# Patient Record
Sex: FEMALE | Race: WHITE | NOT HISPANIC OR LATINO | URBAN - METROPOLITAN AREA
[De-identification: names, ages, dates, MRNs, and addresses within clinical notes are randomized per-mention and may not be internally consistent; named-entity substitution may affect disease eponyms.]

---

## 2019-05-09 ENCOUNTER — OFFICE (OUTPATIENT)
Dept: URBAN - METROPOLITAN AREA CLINIC 33 | Facility: CLINIC | Age: 44
End: 2019-05-09

## 2019-05-09 VITALS
TEMPERATURE: 98.1 F | WEIGHT: 130 LBS | DIASTOLIC BLOOD PRESSURE: 76 MMHG | HEIGHT: 64 IN | HEART RATE: 66 BPM | SYSTOLIC BLOOD PRESSURE: 109 MMHG

## 2019-05-09 DIAGNOSIS — K51.90 ULCERATIVE COLITIS, UNSPECIFIED, WITHOUT COMPLICATIONS: ICD-10-CM

## 2019-05-09 DIAGNOSIS — K62.5 HEMORRHAGE OF ANUS AND RECTUM: ICD-10-CM

## 2019-05-09 PROCEDURE — 99204 OFFICE O/P NEW MOD 45 MIN: CPT

## 2019-05-09 NOTE — SERVICEHPINOTES
SINCERE MENDEZ   is a   43  female who presents with blood in stool over 6 moths. Reports a hx of hemorrhoids. Recently delivered a baby, daughter 6 months ago. Reports a hx of ulcerative colitis diagnosed in 1999. Had another colonoscopy in 2001 in [place], told to be unremarkable, per patient. No flare up symptoms since then.  BRRecently, noted blood in stool especially after running.  Abdominal discomfort especially after eating fried foods. Eating cheeses can alleviate the pain. No correlating symptoms as UC. BRMoves bowel daily on BSS type 4-3. The blood is only with bowel movement, seen on the toilet paper and inside the toilet. Denies diarrhea, lower abdominal pain or constipation. Losing weight since delivery. + external hemorrhoids. Denies rectal pain, itchiness or discharge. BRDenies nausea, vomiting, dysphagia, acid reflux, dyspepsia or early satiety.BRShe was taking ibuprofen twice a day up to three times a week over 4 weeks for body aches after running. Stopped it last week. Denies chest pain, palpitations or sob with exertion. Denies colon cancer or IBD.   BR

## 2019-05-10 ENCOUNTER — OFFICE (OUTPATIENT)
Dept: URBAN - METROPOLITAN AREA CLINIC 33 | Facility: CLINIC | Age: 44
End: 2019-05-10

## 2019-05-10 LAB
AMBIG ABBREV CMP14 DEFAULT: (no result)
CBC WITH DIFFERENTIAL/PLATELET: BASO (ABSOLUTE): 0 X10E3/UL (ref 0–0.2)
CBC WITH DIFFERENTIAL/PLATELET: BASOS: 0 %
CBC WITH DIFFERENTIAL/PLATELET: EOS (ABSOLUTE): 0.1 X10E3/UL (ref 0–0.4)
CBC WITH DIFFERENTIAL/PLATELET: EOS: 2 %
CBC WITH DIFFERENTIAL/PLATELET: HEMATOCRIT: 43.1 % (ref 34–46.6)
CBC WITH DIFFERENTIAL/PLATELET: HEMATOLOGY COMMENTS: (no result)
CBC WITH DIFFERENTIAL/PLATELET: HEMOGLOBIN: 13.9 G/DL (ref 11.1–15.9)
CBC WITH DIFFERENTIAL/PLATELET: IMMATURE CELLS: (no result)
CBC WITH DIFFERENTIAL/PLATELET: IMMATURE GRANS (ABS): 0 X10E3/UL (ref 0–0.1)
CBC WITH DIFFERENTIAL/PLATELET: IMMATURE GRANULOCYTES: 0 %
CBC WITH DIFFERENTIAL/PLATELET: LYMPHS (ABSOLUTE): 1.9 X10E3/UL (ref 0.7–3.1)
CBC WITH DIFFERENTIAL/PLATELET: LYMPHS: 23 %
CBC WITH DIFFERENTIAL/PLATELET: MCH: 29.1 PG (ref 26.6–33)
CBC WITH DIFFERENTIAL/PLATELET: MCHC: 32.3 G/DL (ref 31.5–35.7)
CBC WITH DIFFERENTIAL/PLATELET: MCV: 90 FL (ref 79–97)
CBC WITH DIFFERENTIAL/PLATELET: MONOCYTES(ABSOLUTE): 0.6 X10E3/UL (ref 0.1–0.9)
CBC WITH DIFFERENTIAL/PLATELET: MONOCYTES: 8 %
CBC WITH DIFFERENTIAL/PLATELET: NEUTROPHILS (ABSOLUTE): 5.5 X10E3/UL (ref 1.4–7)
CBC WITH DIFFERENTIAL/PLATELET: NEUTROPHILS: 67 %
CBC WITH DIFFERENTIAL/PLATELET: NRBC: (no result)
CBC WITH DIFFERENTIAL/PLATELET: PLATELETS: 277 X10E3/UL (ref 150–379)
CBC WITH DIFFERENTIAL/PLATELET: RBC: 4.77 X10E6/UL (ref 3.77–5.28)
CBC WITH DIFFERENTIAL/PLATELET: RDW: 13.7 % (ref 12.3–15.4)
CBC WITH DIFFERENTIAL/PLATELET: WBC: 8.1 X10E3/UL (ref 3.4–10.8)
COMP. METABOLIC PANEL (14): A/G RATIO: 2 (ref 1.2–2.2)
COMP. METABOLIC PANEL (14): ALBUMIN: 4.6 G/DL (ref 3.5–5.5)
COMP. METABOLIC PANEL (14): ALKALINE PHOSPHATASE: 78 IU/L (ref 39–117)
COMP. METABOLIC PANEL (14): ALT (SGPT): 16 IU/L (ref 0–32)
COMP. METABOLIC PANEL (14): AST (SGOT): 21 IU/L (ref 0–40)
COMP. METABOLIC PANEL (14): BILIRUBIN, TOTAL: 0.4 MG/DL (ref 0–1.2)
COMP. METABOLIC PANEL (14): BUN/CREATININE RATIO: 27 — HIGH (ref 9–23)
COMP. METABOLIC PANEL (14): BUN: 22 MG/DL (ref 6–24)
COMP. METABOLIC PANEL (14): CALCIUM: 10 MG/DL (ref 8.7–10.2)
COMP. METABOLIC PANEL (14): CARBON DIOXIDE, TOTAL: 20 MMOL/L (ref 20–29)
COMP. METABOLIC PANEL (14): CHLORIDE: 103 MMOL/L (ref 96–106)
COMP. METABOLIC PANEL (14): CREATININE: 0.83 MG/DL (ref 0.57–1)
COMP. METABOLIC PANEL (14): EGFR IF AFRICN AM: 101 ML/MIN/1.73 (ref 59–?)
COMP. METABOLIC PANEL (14): EGFR IF NONAFRICN AM: 88 ML/MIN/1.73 (ref 59–?)
COMP. METABOLIC PANEL (14): GLOBULIN, TOTAL: 2.3 G/DL (ref 1.5–4.5)
COMP. METABOLIC PANEL (14): GLUCOSE: 84 MG/DL (ref 65–99)
COMP. METABOLIC PANEL (14): POTASSIUM: 3.9 MMOL/L (ref 3.5–5.2)
COMP. METABOLIC PANEL (14): PROTEIN, TOTAL: 6.9 G/DL (ref 6–8.5)
COMP. METABOLIC PANEL (14): SODIUM: 142 MMOL/L (ref 134–144)

## 2019-05-10 PROCEDURE — 00031: CPT

## 2019-05-21 ENCOUNTER — OFFICE (OUTPATIENT)
Dept: URBAN - METROPOLITAN AREA CLINIC 32 | Facility: CLINIC | Age: 44
End: 2019-05-21
Payer: COMMERCIAL

## 2019-05-21 ENCOUNTER — OFFICE (OUTPATIENT)
Dept: URBAN - METROPOLITAN AREA PATHOLOGY 17 | Facility: PATHOLOGY | Age: 44
End: 2019-05-21

## 2019-05-21 VITALS
OXYGEN SATURATION: 99 % | DIASTOLIC BLOOD PRESSURE: 82 MMHG | RESPIRATION RATE: 16 BRPM | OXYGEN SATURATION: 100 % | TEMPERATURE: 97.9 F | TEMPERATURE: 97.7 F | RESPIRATION RATE: 29 BRPM | SYSTOLIC BLOOD PRESSURE: 111 MMHG | SYSTOLIC BLOOD PRESSURE: 103 MMHG | DIASTOLIC BLOOD PRESSURE: 78 MMHG | HEIGHT: 64 IN | DIASTOLIC BLOOD PRESSURE: 64 MMHG | SYSTOLIC BLOOD PRESSURE: 115 MMHG | OXYGEN SATURATION: 98 % | RESPIRATION RATE: 31 BRPM | WEIGHT: 130 LBS | DIASTOLIC BLOOD PRESSURE: 76 MMHG | OXYGEN SATURATION: 97 % | RESPIRATION RATE: 25 BRPM | RESPIRATION RATE: 22 BRPM | SYSTOLIC BLOOD PRESSURE: 124 MMHG | DIASTOLIC BLOOD PRESSURE: 71 MMHG | SYSTOLIC BLOOD PRESSURE: 98 MMHG | SYSTOLIC BLOOD PRESSURE: 108 MMHG | DIASTOLIC BLOOD PRESSURE: 69 MMHG | DIASTOLIC BLOOD PRESSURE: 63 MMHG | HEART RATE: 63 BPM | HEART RATE: 65 BPM | HEART RATE: 64 BPM

## 2019-05-21 DIAGNOSIS — K51.80 OTHER ULCERATIVE COLITIS WITHOUT COMPLICATIONS: ICD-10-CM

## 2019-05-21 DIAGNOSIS — K62.5 HEMORRHAGE OF ANUS AND RECTUM: ICD-10-CM

## 2019-05-21 DIAGNOSIS — K63.5 POLYP OF COLON: ICD-10-CM

## 2019-05-21 DIAGNOSIS — K50.018 CROHN'S DISEASE OF SMALL INTESTINE WITH OTHER COMPLICATION: ICD-10-CM

## 2019-05-21 PROBLEM — D12.0 BENIGN NEOPLASM OF CECUM: Status: ACTIVE | Noted: 2019-05-21

## 2019-05-21 PROCEDURE — 45380 COLONOSCOPY AND BIOPSY: CPT

## 2019-05-21 PROCEDURE — 88305 TISSUE EXAM BY PATHOLOGIST: CPT

## 2019-05-21 RX ADMIN — Medication: at 09:48

## 2019-06-18 ENCOUNTER — OFFICE (OUTPATIENT)
Dept: URBAN - METROPOLITAN AREA CLINIC 33 | Facility: CLINIC | Age: 44
End: 2019-06-18

## 2019-06-18 VITALS — HEIGHT: 64 IN

## 2019-06-18 DIAGNOSIS — K64.8 OTHER HEMORRHOIDS: ICD-10-CM

## 2019-06-18 DIAGNOSIS — K62.5 HEMORRHAGE OF ANUS AND RECTUM: ICD-10-CM

## 2019-06-18 DIAGNOSIS — K51.80 OTHER ULCERATIVE COLITIS WITHOUT COMPLICATIONS: ICD-10-CM

## 2019-06-18 PROCEDURE — 99214 OFFICE O/P EST MOD 30 MIN: CPT

## 2022-07-19 ENCOUNTER — TELEHEALTH PROVIDED IN PATIENT'S HOME (OUTPATIENT)
Dept: URBAN - METROPOLITAN AREA TELEHEALTH 3 | Facility: TELEHEALTH | Age: 47
End: 2022-07-19

## 2022-07-19 VITALS — HEIGHT: 64 IN | WEIGHT: 133 LBS

## 2022-07-19 DIAGNOSIS — R19.7 DIARRHEA, UNSPECIFIED: ICD-10-CM

## 2022-07-19 DIAGNOSIS — K51.90 ULCERATIVE COLITIS, UNSPECIFIED, WITHOUT COMPLICATIONS: ICD-10-CM

## 2022-07-19 PROCEDURE — 99204 OFFICE O/P NEW MOD 45 MIN: CPT | Mod: 95 | Performed by: PHYSICIAN ASSISTANT

## 2022-07-19 RX ORDER — MESALAMINE 1.2 G/1
TABLET, DELAYED RELEASE ORAL
Qty: 120 | Refills: 5 | Status: ACTIVE
Start: 2022-07-19

## 2022-07-19 NOTE — SERVICEHPINOTES
PATIENT VERIFIED BY DATE OF BIRTH AND NAME. Patient has been consented for this telecommunication visit.
yasmin hoffman  SINCERE MENDEZ   is a   46   year old    female who is being seen in consultation at the request of   ANTOINETTE ORTEGA   for concerns about colitis. She was originally diagnosed in 1998 and was on Sulfasalazine and Asacol -- stopped this after a few years - sx had resolved. Did pretty well over the years. She had some symptoms, including bleeding, and had a colonoscopy with us in 2019 - had been using NSAIDs at that time. Colonoscopy showed ileitis, benign polyps, normal-appearing tissue but chronic active colitis in a random biopsy. Tried Lialda and felt worse, so stopped. yasmin hoffman Last year she developed some redness in one eye and her eye doctor thought it might be related to her colitis. This has started happening again recently. Also notes recent hip pain as well as some abdominal cramping and diarrhea. She notes that exercise can seem to worsen GI symptoms (but no bleeding). Normally has a daily BM. 
yasmin hoffman With recent symptoms, she started taking her old mesalamine 1.2 g, 4+ pills/day. Feels they have been helpful and she would like a script.

## 2022-08-30 ENCOUNTER — OFFICE (OUTPATIENT)
Dept: URBAN - METROPOLITAN AREA PATHOLOGY 18 | Facility: PATHOLOGY | Age: 47
End: 2022-08-30

## 2022-08-30 ENCOUNTER — OFFICE (OUTPATIENT)
Dept: URBAN - METROPOLITAN AREA CLINIC 30 | Facility: CLINIC | Age: 47
End: 2022-08-30
Payer: COMMERCIAL

## 2022-08-30 VITALS
OXYGEN SATURATION: 99 % | HEART RATE: 67 BPM | OXYGEN SATURATION: 96 % | TEMPERATURE: 97.7 F | SYSTOLIC BLOOD PRESSURE: 112 MMHG | RESPIRATION RATE: 16 BRPM | SYSTOLIC BLOOD PRESSURE: 110 MMHG | SYSTOLIC BLOOD PRESSURE: 113 MMHG | HEART RATE: 63 BPM | DIASTOLIC BLOOD PRESSURE: 60 MMHG | HEIGHT: 64 IN | DIASTOLIC BLOOD PRESSURE: 63 MMHG | SYSTOLIC BLOOD PRESSURE: 118 MMHG | RESPIRATION RATE: 15 BRPM | DIASTOLIC BLOOD PRESSURE: 71 MMHG | DIASTOLIC BLOOD PRESSURE: 73 MMHG | HEART RATE: 61 BPM | DIASTOLIC BLOOD PRESSURE: 74 MMHG | HEART RATE: 62 BPM | TEMPERATURE: 97.8 F | WEIGHT: 133 LBS | SYSTOLIC BLOOD PRESSURE: 107 MMHG | SYSTOLIC BLOOD PRESSURE: 108 MMHG | RESPIRATION RATE: 26 BRPM | OXYGEN SATURATION: 98 % | DIASTOLIC BLOOD PRESSURE: 81 MMHG | RESPIRATION RATE: 20 BRPM | HEART RATE: 65 BPM | OXYGEN SATURATION: 100 %

## 2022-08-30 DIAGNOSIS — R19.7 DIARRHEA, UNSPECIFIED: ICD-10-CM

## 2022-08-30 DIAGNOSIS — K63.3 ULCER OF INTESTINE: ICD-10-CM

## 2022-08-30 DIAGNOSIS — K62.5 HEMORRHAGE OF ANUS AND RECTUM: ICD-10-CM

## 2022-08-30 DIAGNOSIS — K63.89 OTHER SPECIFIED DISEASES OF INTESTINE: ICD-10-CM

## 2022-08-30 PROCEDURE — 88305 TISSUE EXAM BY PATHOLOGIST: CPT | Performed by: PATHOLOGY

## 2022-11-10 ENCOUNTER — TELEHEALTH PROVIDED IN PATIENT'S HOME (OUTPATIENT)
Dept: URBAN - METROPOLITAN AREA TELEHEALTH 3 | Facility: TELEHEALTH | Age: 47
End: 2022-11-10

## 2022-11-10 VITALS — HEIGHT: 64 IN | WEIGHT: 134 LBS

## 2022-11-10 DIAGNOSIS — K52.9 NONINFECTIVE GASTROENTERITIS AND COLITIS, UNSPECIFIED: ICD-10-CM

## 2022-11-10 DIAGNOSIS — R19.7 DIARRHEA, UNSPECIFIED: ICD-10-CM

## 2022-11-10 PROCEDURE — 99214 OFFICE O/P EST MOD 30 MIN: CPT | Mod: 95 | Performed by: PHYSICIAN ASSISTANT

## 2022-11-10 NOTE — SERVICEHPINOTES
PATIENT VERIFIED BY DATE OF BIRTH AND NAME. Patient has been consented for this telecommunication visit.
yasmin hoffman 
45 yo female presents for for f/u IBD. She had an updated colonoscopy on August 30th showing ulceration and erythema in the terminal ileum and ileal biopsy showed granulomata. No colitis noted this time. 
yasmin Coe was originally diagnosed with UC in 1998 and was on Sulfasalazine and Asacol -- stopped this after a few years - sx had resolved. Did pretty well over the years. She had some symptoms, including bleeding, and had a colonoscopy with us in 2019 - had been using NSAIDs at that time. Colonoscopy showed ileitis, benign polyps, normal-appearing tissue but chronic active colitis in a random biopsy. Tried Lialda and felt worse, so stopped. Last year she developed some redness in one eye and her eye doctor thought it might be related to her colitis. This has started happening again this past summer, and she was also having hip pain as well as some abdominal cramping and diarrhea, which prompted her to see us. She notes that exercise can seem to worsen GI symptoms (but no bleeding). Normally has a daily BM. With the symptoms this past summer, she started taking her old mesalamine 1.2 g, 4+ pills/day and felt some improvement. Has not been taking this consistently. 
yasmin hoffman Did not do the fecal calpro stool test we ordered last time.
yasmin hoffman Overall feeling pretty well today. No current GI complaints and denies rashes, joint pains, eye symptoms. Her weight is stable. She minimizes NSAID use. Nonsmoker. Denies pregnancy or any plans on having any more children. yasmin hoffman ROS today is negative.yasmin

## 2022-11-16 LAB
C-REACTIVE PROTEIN: 2.1 MG/L (ref ?–8)
CBC (INCLUDES DIFF/PLT): ABSOLUTE BAND NEUTROPHILS: NORMAL CELLS/UL
CBC (INCLUDES DIFF/PLT): ABSOLUTE BASOPHILS: 18 CELLS/UL (ref 0–200)
CBC (INCLUDES DIFF/PLT): ABSOLUTE BLASTS: NORMAL CELLS/UL
CBC (INCLUDES DIFF/PLT): ABSOLUTE EOSINOPHILS: 79 CELLS/UL (ref 15–500)
CBC (INCLUDES DIFF/PLT): ABSOLUTE LYMPHOCYTES: 1818 CELLS/UL (ref 850–3900)
CBC (INCLUDES DIFF/PLT): ABSOLUTE METAMYELOCYTES: NORMAL CELLS/UL
CBC (INCLUDES DIFF/PLT): ABSOLUTE MONOCYTES: 439 CELLS/UL (ref 200–950)
CBC (INCLUDES DIFF/PLT): ABSOLUTE MYELOCYTES: NORMAL CELLS/UL
CBC (INCLUDES DIFF/PLT): ABSOLUTE NEUTROPHILS: 3745 CELLS/UL (ref 1500–7800)
CBC (INCLUDES DIFF/PLT): ABSOLUTE NUCLEATED RBC: NORMAL CELLS/UL
CBC (INCLUDES DIFF/PLT): ABSOLUTE PROMYELOCYTES: NORMAL CELLS/UL
CBC (INCLUDES DIFF/PLT): BAND NEUTROPHILS: NORMAL %
CBC (INCLUDES DIFF/PLT): BASOPHILS: 0.3 %
CBC (INCLUDES DIFF/PLT): BLASTS: NORMAL %
CBC (INCLUDES DIFF/PLT): COMMENT(S): NORMAL
CBC (INCLUDES DIFF/PLT): EOSINOPHILS: 1.3 %
CBC (INCLUDES DIFF/PLT): HEMATOCRIT: 44 % (ref 35–45)
CBC (INCLUDES DIFF/PLT): HEMOGLOBIN: 14.9 G/DL (ref 11.7–15.5)
CBC (INCLUDES DIFF/PLT): LYMPHOCYTES: 29.8 %
CBC (INCLUDES DIFF/PLT): MCH: 30.5 PG (ref 27–33)
CBC (INCLUDES DIFF/PLT): MCHC: 33.9 G/DL (ref 32–36)
CBC (INCLUDES DIFF/PLT): MCV: 90 FL (ref 80–100)
CBC (INCLUDES DIFF/PLT): METAMYELOCYTES: NORMAL %
CBC (INCLUDES DIFF/PLT): MONOCYTES: 7.2 %
CBC (INCLUDES DIFF/PLT): MPV: 11.4 FL (ref 7.5–12.5)
CBC (INCLUDES DIFF/PLT): MYELOCYTES: NORMAL %
CBC (INCLUDES DIFF/PLT): NEUTROPHILS: 61.4 %
CBC (INCLUDES DIFF/PLT): NUCLEATED RBC: NORMAL /100 WBC
CBC (INCLUDES DIFF/PLT): PLATELET COUNT: 229 THOUSAND/UL (ref 140–400)
CBC (INCLUDES DIFF/PLT): PROMYELOCYTES: NORMAL %
CBC (INCLUDES DIFF/PLT): RDW: 12.2 % (ref 11–15)
CBC (INCLUDES DIFF/PLT): REACTIVE LYMPHOCYTES: NORMAL %
CBC (INCLUDES DIFF/PLT): RED BLOOD CELL COUNT: 4.89 MILLION/UL (ref 3.8–5.1)
CBC (INCLUDES DIFF/PLT): WHITE BLOOD CELL COUNT: 6.1 THOUSAND/UL (ref 3.8–10.8)
COMPREHENSIVE METABOLIC PANEL: ALBUMIN/GLOBULIN RATIO: 2 (CALC) (ref 1–2.5)
COMPREHENSIVE METABOLIC PANEL: ALBUMIN: 4.4 G/DL (ref 3.6–5.1)
COMPREHENSIVE METABOLIC PANEL: ALKALINE PHOSPHATASE: 52 U/L (ref 31–125)
COMPREHENSIVE METABOLIC PANEL: ALT: 18 U/L (ref 6–29)
COMPREHENSIVE METABOLIC PANEL: AST: 21 U/L (ref 10–30)
COMPREHENSIVE METABOLIC PANEL: BILIRUBIN, TOTAL: 0.4 MG/DL (ref 0.2–1.2)
COMPREHENSIVE METABOLIC PANEL: BUN/CREATININE RATIO: (no result) (CALC)
COMPREHENSIVE METABOLIC PANEL: CALCIUM: 9 MG/DL (ref 8.6–10.2)
COMPREHENSIVE METABOLIC PANEL: CARBON DIOXIDE: 25 MMOL/L (ref 20–32)
COMPREHENSIVE METABOLIC PANEL: CHLORIDE: 106 MMOL/L (ref 98–110)
COMPREHENSIVE METABOLIC PANEL: CREATININE: 0.78 MG/DL (ref 0.5–0.99)
COMPREHENSIVE METABOLIC PANEL: EGFR: 96 ML/MIN/1.73M2 (ref 60–?)
COMPREHENSIVE METABOLIC PANEL: GLOBULIN: 2.2 G/DL (CALC) (ref 1.9–3.7)
COMPREHENSIVE METABOLIC PANEL: GLUCOSE: 86 MG/DL (ref 65–139)
COMPREHENSIVE METABOLIC PANEL: POTASSIUM: 3.8 MMOL/L (ref 3.5–5.3)
COMPREHENSIVE METABOLIC PANEL: PROTEIN, TOTAL: 6.6 G/DL (ref 6.1–8.1)
COMPREHENSIVE METABOLIC PANEL: SODIUM: 139 MMOL/L (ref 135–146)
COMPREHENSIVE METABOLIC PANEL: UREA NITROGEN (BUN): 15 MG/DL (ref 7–25)
FERRITIN: 60 NG/ML (ref 16–232)
IRON AND TOTAL IRON BINDING CAPACITY: % SATURATION: 26 % (CALC) (ref 16–45)
IRON AND TOTAL IRON BINDING CAPACITY: IRON BINDING CAPACITY: 275 MCG/DL (CALC) (ref 250–450)
IRON AND TOTAL IRON BINDING CAPACITY: IRON, TOTAL: 71 MCG/DL (ref 40–190)
SED RATE BY MODIFIED WESTERGREN: 2 MM/H (ref ?–20)
VITAMIN B12/FOLATE, SERUM PANEL: FOLATE, SERUM: 22.4 NG/ML
VITAMIN B12/FOLATE, SERUM PANEL: VITAMIN B12: 628 PG/ML (ref 200–1100)
VITAMIN D,25-OH,TOTAL,IA: 64 NG/ML (ref 30–100)

## 2022-11-24 LAB — CALPROTECTIN, STOOL: 97 MCG/G

## 2023-02-10 ENCOUNTER — OFFICE (OUTPATIENT)
Dept: URBAN - METROPOLITAN AREA CLINIC 34 | Facility: CLINIC | Age: 48
End: 2023-02-10

## 2023-02-10 VITALS
TEMPERATURE: 97.7 F | HEART RATE: 65 BPM | DIASTOLIC BLOOD PRESSURE: 74 MMHG | SYSTOLIC BLOOD PRESSURE: 106 MMHG | HEIGHT: 64 IN | WEIGHT: 134 LBS

## 2023-02-10 DIAGNOSIS — K52.9 NONINFECTIVE GASTROENTERITIS AND COLITIS, UNSPECIFIED: ICD-10-CM

## 2023-02-10 PROCEDURE — 99214 OFFICE O/P EST MOD 30 MIN: CPT | Performed by: PHYSICIAN ASSISTANT

## 2023-02-10 NOTE — SERVICEHPINOTES
48 yo female presents for for f/u IBD. She had an updated colonoscopy on August 30th showing ulceration and erythema in the terminal ileum and ileal biopsy showed granulomata. No colitis noted this time. Her labs in November looked good, but fecal calpro was at 97 and MR enterography showed inflamed terminal ileum. She started on budesonide 9 mg and Pentasa in mid-December and notes some general improvement. She feels well overall and reports a formed stool in the mornings and often a formed stool in the afternoon. No abdominal pain or other concerns. Denies rashes, joint pains, eye symptoms. Her weight is stable. She minimizes NSAID use. Nonsmoker. yasmin hoffman Prior hx: Saravanan was originally diagnosed with UC in 1998 and was on Sulfasalazine and Asacol -- stopped this after a few years - sx had resolved. Did pretty well over the years. She had some symptoms, including bleeding, and had a colonoscopy with us in 2019 - had been using NSAIDs at that time. Colonoscopy showed ileitis, benign polyps, normal-appearing tissue but chronic active colitis in a random biopsy. Tried Lialda and felt worse, so stopped.Last year she developed some redness in one eye and her eye doctor thought it might be related to her colitis. This has started happening again this past summer, and she was also having hip pain as well as some abdominal cramping and diarrhea, which prompted her to see us. She notes that exercise can seem to worsen GI symptoms (but no bleeding). Normally has a daily BM.With the symptoms this past summer, she started taking her old mesalamine 1.2 g, 4+ pills/day and felt some improvement. Was not taking it consistently when seen last November. yasmin

## 2023-04-04 ENCOUNTER — TELEHEALTH PROVIDED IN PATIENT'S HOME (OUTPATIENT)
Dept: URBAN - METROPOLITAN AREA TELEHEALTH 7 | Facility: TELEHEALTH | Age: 48
End: 2023-04-04

## 2023-04-04 VITALS — WEIGHT: 135 LBS | HEIGHT: 64 IN

## 2023-04-04 DIAGNOSIS — K52.9 NONINFECTIVE GASTROENTERITIS AND COLITIS, UNSPECIFIED: ICD-10-CM

## 2023-04-04 DIAGNOSIS — K64.8 OTHER HEMORRHOIDS: ICD-10-CM

## 2023-04-04 PROCEDURE — 99214 OFFICE O/P EST MOD 30 MIN: CPT | Mod: 95 | Performed by: PHYSICIAN ASSISTANT

## 2023-04-04 NOTE — SERVICEHPINOTES
PATIENT VERIFIED BY DATE OF BIRTH AND NAME. Patient has been consented for this telecommunication visit.
yasmin hoffman 
46 yo female with IBD/ileitis presents to discuss hemorrhoid banding. She has times with either prolapsed hemorrhoid tissue vs. external hemorrhoids. Can be painful. Denies bleeding. Has used topicals at times and says the tissue goes back in over time. She hasn't tried to push it in. She denies straining or constipation. Feeling well otherwise. No other concerns today. She tapered off her budesonide and was done with that by mid-March. Still on Pentasa. 
Prior hx: Saravanan was originally diagnosed with UC in 1998 and was on Sulfasalazine and Asacol -- stopped this after a few years - sx had resolved. Did pretty well over the years. She had some symptoms, including bleeding, and had a colonoscopy with us in 2019 - had been using NSAIDs at that time. Colonoscopy showed ileitis, benign polyps, normal-appearing tissue but chronic active colitis in a random biopsy. Tried Lialda and felt worse, so stopped.Last year she developed some redness in one eye and her eye doctor thought it might be related to her colitis. This has started happening again this past summer, and she was also having hip pain as well as some abdominal cramping and diarrhea, which prompted her to see us. She notes that exercise can seem to worsen GI symptoms (but no bleeding). Normally has a daily BM.yasmin Coe had an updated colonoscopy on August 30th 2022 showing ulceration and erythema in the terminal ileum and ileal biopsy showed granulomata. No colitis noted this time. Her labs in November looked good, but fecal calpro was at 97 and MR enterography showed inflamed terminal ileum. She started on budesonide 9 mg and Pentasa in mid-December and notes some general improvement. She feels well overall and reports a formed stool in the mornings and often a formed stool in the afternoon. No abdominal pain or other concerns. Denies rashes, joint pains, eye symptoms. Her weight is stable. She minimizes NSAID use. Nonsmoker.
yasmin hoffman  ROS as above, otherwise negative. yasmin

## 2023-06-02 PROBLEM — D12.3 BENIGN NEOPLASM OF TRANSVERSE COLON: Status: ACTIVE | Noted: 2019-05-21

## 2023-06-02 PROBLEM — K63.89 OTHER SPECIFIED DISEASES OF INTESTINE: Status: ACTIVE | Noted: 2022-08-30

## 2023-06-05 ENCOUNTER — OFFICE (OUTPATIENT)
Dept: URBAN - METROPOLITAN AREA CLINIC 34 | Facility: CLINIC | Age: 48
End: 2023-06-05

## 2023-06-05 VITALS
TEMPERATURE: 97.7 F | HEART RATE: 58 BPM | DIASTOLIC BLOOD PRESSURE: 86 MMHG | HEIGHT: 64 IN | WEIGHT: 136 LBS | SYSTOLIC BLOOD PRESSURE: 115 MMHG

## 2023-06-05 DIAGNOSIS — K64.2 THIRD DEGREE HEMORRHOIDS: ICD-10-CM

## 2023-06-05 PROCEDURE — 46221 LIGATION OF HEMORRHOID(S): CPT | Performed by: INTERNAL MEDICINE

## 2023-07-11 ENCOUNTER — OFFICE (OUTPATIENT)
Dept: URBAN - METROPOLITAN AREA CLINIC 34 | Facility: CLINIC | Age: 48
End: 2023-07-11

## 2023-07-11 VITALS
WEIGHT: 130 LBS | HEART RATE: 61 BPM | TEMPERATURE: 97.6 F | DIASTOLIC BLOOD PRESSURE: 72 MMHG | SYSTOLIC BLOOD PRESSURE: 97 MMHG | HEIGHT: 64 IN

## 2023-07-11 DIAGNOSIS — K64.1 SECOND DEGREE HEMORRHOIDS: ICD-10-CM

## 2023-07-11 PROCEDURE — 46221 LIGATION OF HEMORRHOID(S): CPT | Performed by: INTERNAL MEDICINE

## 2023-07-13 ENCOUNTER — TELEHEALTH PROVIDED IN PATIENT'S HOME (OUTPATIENT)
Dept: URBAN - METROPOLITAN AREA TELEHEALTH 7 | Facility: TELEHEALTH | Age: 48
End: 2023-07-13

## 2023-07-13 VITALS — HEIGHT: 64 IN | WEIGHT: 131 LBS

## 2023-07-13 DIAGNOSIS — K52.9 NONINFECTIVE GASTROENTERITIS AND COLITIS, UNSPECIFIED: ICD-10-CM

## 2023-07-13 PROCEDURE — 99214 OFFICE O/P EST MOD 30 MIN: CPT | Mod: 95 | Performed by: PHYSICIAN ASSISTANT

## 2023-07-13 RX ORDER — MESALAMINE 500 MG/1
CAPSULE ORAL
Qty: 540 | Refills: 2 | Status: ACTIVE
Start: 2022-12-14

## 2023-07-13 NOTE — SERVICENOTES
Patient's visit was conducted through [employer] video telecommunication. Patient consented before the start of visit as to understanding of privacy concerns, possible technological failure, and their responsibility of carrying out instructions of plan.

## 2023-07-13 NOTE — SERVICEHPINOTES
PATIENT VERIFIED BY DATE OF BIRTH AND NAME. Patient has been consented for this telecommunication visit.
yasmin hoffman47 yo female presents for for f/u IBD. She had an updated colonoscopy in August 2022 showing ulceration and erythema in the terminal ileum and ileal biopsy showed granulomata. No colitis noted this time. Her labs in November looked good, but fecal calpro was at 97 and MR enterography showed inflamed terminal ileum. She started on budesonide 9 mg and Pentasa in mid-December and noted some general improvement. She has continued on Pentasa and reports doing well overall. No current GI concerns. Recent fecal calpro essentially stable at 114 and she prefers to not go on more aggressive therapy.  She feels well overall and reports a formed stool in the mornings and often a formed stool in the afternoon. No abdominal pain or other concerns. Denies rashes, joint pains, eye symptoms. Her weight is stable. She minimizes NSAID use. Nonsmoker.
yasmin hoffman  Has had hemorrhoid banding x 2 in the past few months and reports doing well. Prior hx: Saravanan was originally diagnosed with UC in 1998 and was on Sulfasalazine and Asacol -- stopped this after a few years - sx had resolved. Did pretty well over the years. She had some symptoms, including bleeding, and had a colonoscopy with us in 2019 - had been using NSAIDs at that time. Colonoscopy showed ileitis, benign polyps, normal-appearing tissue but chronic active colitis in a random biopsy. Tried Lialda and felt worse, so stopped.Last year she developed some redness in one eye and her eye doctor thought it might be related to her colitis. This has started happening again this past summer, and she was also having hip pain as well as some abdominal cramping and diarrhea, which prompted her to see us. 
yasmin hoffmanWith the symptoms this past summer, she started taking her old mesalamine 1.2 g, 4+ pills/day and felt some improvement. Was not taking it consistently when seen last November.
yasmin hoffman ROS as above, otherwises negative.yasmin

## 2024-07-09 ENCOUNTER — TELEHEALTH PROVIDED IN PATIENT'S HOME (OUTPATIENT)
Dept: URBAN - METROPOLITAN AREA TELEHEALTH 7 | Facility: TELEHEALTH | Age: 49
End: 2024-07-09
Payer: COMMERCIAL

## 2024-07-09 VITALS — HEIGHT: 64 IN | WEIGHT: 135 LBS

## 2024-07-09 DIAGNOSIS — K52.9 NONINFECTIVE GASTROENTERITIS AND COLITIS, UNSPECIFIED: ICD-10-CM

## 2024-07-09 PROCEDURE — 99214 OFFICE O/P EST MOD 30 MIN: CPT | Mod: 95 | Performed by: PHYSICIAN ASSISTANT

## 2024-07-09 RX ORDER — MESALAMINE 500 MG/1
CAPSULE, EXTENDED RELEASE ORAL
Qty: 540 | Refills: 3 | Status: ACTIVE

## 2024-07-09 NOTE — SERVICEHPINOTES
PATIENT VERIFIED BY DATE OF BIRTH AND NAME. Patient has been consented for this telecommunication visit using [employer] application.
yasmin

br48 yo female presents for for f/u IBD. 
yasmin hoffman She had an updated colonoscopy in August 2022 showing ulceration and erythema in the terminal ileum and ileal biopsy showed granulomata. No colitis noted this time. Her labs in November 2022 looked good, but fecal calpro was at 97 and MR enterography showed inflamed terminal ileum. She started on budesonide 9 mg and Pentasa in mid-December 2022 and noted some general improvement. 
br
yasmin She continued on Pentasa. Fecal calpro in 2023 was at 114 and she was continuing to feel well overall. 
yasmin hoffman Updated fecal calpro January 2024 was normal at 16, and she also had updated routine labs in February.
br
yasmin She is doing very well. No complaints or GI concerns. She has a formed stool in the mornings and often a formed stool in the afternoon. No abdominal pain or bleeding. Denies rashes, joint pains, eye symptoms. Her weight is stable. She minimizes NSAID use. Nonsmoker.Has had hemorrhoid bandings in 2023. Prior hx:Saravanan was originally diagnosed with UC in 1998 and was on Sulfasalazine and Asacol -- stopped this after a few years - sx had resolved. Did pretty well over the years. She had some symptoms, including bleeding, and had a colonoscopy with us in 2019 - had been using NSAIDs at that time. Colonoscopy showed ileitis, benign polyps, normal-appearing tissue but chronic active colitis in a random biopsy. Tried Lialda and felt worse, so stopped.In 2021 she developed some redness in one eye and her eye doctor thought it might be related to her colitis. This started happening again in 2022, and she was also having hip pain as well as some abdominal cramping and diarrhea, which prompted her to see us.ROS as above, otherwise negativebr

## 2024-07-09 NOTE — SERVICENOTES
Patient was located in their home during visit., Patient's visit was conducted through [employer] video telecommunication. Patient consented before the start of visit as to understanding of privacy concerns, possible technological failure, and their responsibility of carrying out instructions of plan., I spent 15 minutes today reviewing the chart, talking with the patient, reviewing previous results with patient, discussing plan, and documenting. Patient understands and agrees with plan. Questions/concerns addressed.

## 2025-01-07 ENCOUNTER — TELEHEALTH PROVIDED OTHER THAN IN PATIENT'S HOME (OUTPATIENT)
Dept: URBAN - METROPOLITAN AREA TELEHEALTH 7 | Facility: TELEHEALTH | Age: 50
End: 2025-01-07

## 2025-01-07 VITALS — HEIGHT: 64 IN | WEIGHT: 140 LBS

## 2025-01-07 DIAGNOSIS — K52.9 NONINFECTIVE GASTROENTERITIS AND COLITIS, UNSPECIFIED: ICD-10-CM

## 2025-01-07 PROCEDURE — 99214 OFFICE O/P EST MOD 30 MIN: CPT | Mod: 95 | Performed by: PHYSICIAN ASSISTANT

## 2025-01-07 NOTE — SERVICEHPINOTES
PATIENT VERIFIED BY DATE OF BIRTH AND NAME. Patient has been consented for this telecommunication visit using [employer] application.
48 yo female presents for for f/u IBD/ileitis. See clinical history below. yasmin hoffman She continues on Pentasa and reports doing well overall. She feels like she does better in the summer (and often reduces her medication in the summer), but in the winter, can have mild irritation just medial to right or left eye which can correlate to mildly loose stools. Symptoms typically only last for a couple of days. No other complaints or GI concerns. She normally has a formed stool in the mornings and often a formed stool in the afternoon. No abdominal pain or bleeding. Denies rashes, joint pains, eye symptoms (other than above issue, infrequently). Her weight is stable. She minimizes NSAID use. Nonsmoker.Has had hemorrhoid bandings in 2023. Clinical hx:Saravanan was originally diagnosed with UC in 1998 and was on Sulfasalazine and Asacol -- stopped this after a few years - sx had resolved. Did pretty well over the years. She had some symptoms, including bleeding, and had a colonoscopy with us in 2019 - had been using NSAIDs at that time. Colonoscopy showed ileitis, benign polyps, normal-appearing tissue but chronic active colitis in a random biopsy. Tried Lialda and felt worse, so stopped.In 2021 she developed some redness in one eye and her eye doctor thought it might be related to her colitis. This started happening again in 2022, and she was also having hip pain as well as some abdominal cramping and diarrhea, which prompted her to see us.
yasmin Coe had an updated colonoscopy in August 2022 showing ulceration and erythema in the terminal ileum and ileal biopsy showed granulomata. No colitis noted this time. Her labs in November 2022 looked good, but fecal calpro was at 97 and MR enterography showed inflamed terminal ileum. She started on budesonide 9 mg and Pentasa in mid-December 2022 and noted some general improvement.
yasmin Coe continued on Pentasa. Fecal calpro in 2023 was at 114 and she was continuing to feel well overall. Updated fecal calpro January 2024 was normal at 16. ROS as above, otherwise negative yasimn

## 2025-01-07 NOTE — SERVICENOTES
Patient was located in their home during visit., Patient's visit was conducted through [employer] video telecommunication. Patient consented before the start of visit as to understanding of privacy concerns, possible technological failure, and their responsibility of carrying out instructions of plan., I spent 20 minutes today reviewing the chart, talking with the patient, reviewing previous results with patient, discussing plan, and documenting. Patient understands and agrees with plan. Questions/concerns addressed.

## 2025-01-10 LAB
CBC (INCLUDES DIFF/PLT): ABSOLUTE BAND NEUTROPHILS: NORMAL CELLS/UL
CBC (INCLUDES DIFF/PLT): ABSOLUTE BASOPHILS: 23 CELLS/UL (ref 0–200)
CBC (INCLUDES DIFF/PLT): ABSOLUTE BLASTS: NORMAL CELLS/UL
CBC (INCLUDES DIFF/PLT): ABSOLUTE EOSINOPHILS: 31 CELLS/UL (ref 15–500)
CBC (INCLUDES DIFF/PLT): ABSOLUTE LYMPHOCYTES: 1409 CELLS/UL (ref 850–3900)
CBC (INCLUDES DIFF/PLT): ABSOLUTE METAMYELOCYTES: NORMAL CELLS/UL
CBC (INCLUDES DIFF/PLT): ABSOLUTE MONOCYTES: 585 CELLS/UL (ref 200–950)
CBC (INCLUDES DIFF/PLT): ABSOLUTE MYELOCYTES: NORMAL CELLS/UL
CBC (INCLUDES DIFF/PLT): ABSOLUTE NEUTROPHILS: 5652 CELLS/UL (ref 1500–7800)
CBC (INCLUDES DIFF/PLT): ABSOLUTE NUCLEATED RBC: NORMAL CELLS/UL
CBC (INCLUDES DIFF/PLT): ABSOLUTE PROMYELOCYTES: NORMAL CELLS/UL
CBC (INCLUDES DIFF/PLT): BAND NEUTROPHILS: NORMAL %
CBC (INCLUDES DIFF/PLT): BASOPHILS: 0.3 %
CBC (INCLUDES DIFF/PLT): BLASTS: NORMAL %
CBC (INCLUDES DIFF/PLT): COMMENT(S): NORMAL
CBC (INCLUDES DIFF/PLT): EOSINOPHILS: 0.4 %
CBC (INCLUDES DIFF/PLT): HEMATOCRIT: 42.4 % (ref 35–45)
CBC (INCLUDES DIFF/PLT): HEMOGLOBIN: 13.5 G/DL (ref 11.7–15.5)
CBC (INCLUDES DIFF/PLT): LYMPHOCYTES: 18.3 %
CBC (INCLUDES DIFF/PLT): MCH: 30.3 PG (ref 27–33)
CBC (INCLUDES DIFF/PLT): MCHC: 31.8 G/DL — LOW (ref 32–36)
CBC (INCLUDES DIFF/PLT): MCV: 95.3 FL (ref 80–100)
CBC (INCLUDES DIFF/PLT): METAMYELOCYTES: NORMAL %
CBC (INCLUDES DIFF/PLT): MONOCYTES: 7.6 %
CBC (INCLUDES DIFF/PLT): MPV: 11 FL (ref 7.5–12.5)
CBC (INCLUDES DIFF/PLT): MYELOCYTES: NORMAL %
CBC (INCLUDES DIFF/PLT): NEUTROPHILS: 73.4 %
CBC (INCLUDES DIFF/PLT): NUCLEATED RBC: NORMAL /100 WBC
CBC (INCLUDES DIFF/PLT): PLATELET COUNT: 262 THOUSAND/UL (ref 140–400)
CBC (INCLUDES DIFF/PLT): PROMYELOCYTES: NORMAL %
CBC (INCLUDES DIFF/PLT): RDW: 13.1 % (ref 11–15)
CBC (INCLUDES DIFF/PLT): REACTIVE LYMPHOCYTES: NORMAL %
CBC (INCLUDES DIFF/PLT): RED BLOOD CELL COUNT: 4.45 MILLION/UL (ref 3.8–5.1)
CBC (INCLUDES DIFF/PLT): WHITE BLOOD CELL COUNT: 7.7 THOUSAND/UL (ref 3.8–10.8)
COMPREHENSIVE METABOLIC PANEL: ALBUMIN/GLOBULIN RATIO: 2.1 (CALC) (ref 1–2.5)
COMPREHENSIVE METABOLIC PANEL: ALBUMIN: 4 G/DL (ref 3.6–5.1)
COMPREHENSIVE METABOLIC PANEL: ALKALINE PHOSPHATASE: 40 U/L (ref 31–125)
COMPREHENSIVE METABOLIC PANEL: ALT: 13 U/L (ref 6–29)
COMPREHENSIVE METABOLIC PANEL: AST: 16 U/L (ref 10–35)
COMPREHENSIVE METABOLIC PANEL: BILIRUBIN, TOTAL: 0.7 MG/DL (ref 0.2–1.2)
COMPREHENSIVE METABOLIC PANEL: BUN/CREATININE RATIO: (no result) (CALC)
COMPREHENSIVE METABOLIC PANEL: CALCIUM: 8.8 MG/DL (ref 8.6–10.2)
COMPREHENSIVE METABOLIC PANEL: CARBON DIOXIDE: 26 MMOL/L (ref 20–32)
COMPREHENSIVE METABOLIC PANEL: CHLORIDE: 105 MMOL/L (ref 98–110)
COMPREHENSIVE METABOLIC PANEL: CREATININE: 0.7 MG/DL (ref 0.5–0.99)
COMPREHENSIVE METABOLIC PANEL: EGFR: 107 ML/MIN/1.73M2 (ref 60–?)
COMPREHENSIVE METABOLIC PANEL: GLOBULIN: 1.9 G/DL (CALC) (ref 1.9–3.7)
COMPREHENSIVE METABOLIC PANEL: GLUCOSE: 83 MG/DL (ref 65–99)
COMPREHENSIVE METABOLIC PANEL: POTASSIUM: 4.2 MMOL/L (ref 3.5–5.3)
COMPREHENSIVE METABOLIC PANEL: PROTEIN, TOTAL: 5.9 G/DL — LOW (ref 6.1–8.1)
COMPREHENSIVE METABOLIC PANEL: SODIUM: 138 MMOL/L (ref 135–146)
COMPREHENSIVE METABOLIC PANEL: UREA NITROGEN (BUN): 15 MG/DL (ref 7–25)